# Patient Record
Sex: MALE | Race: WHITE
[De-identification: names, ages, dates, MRNs, and addresses within clinical notes are randomized per-mention and may not be internally consistent; named-entity substitution may affect disease eponyms.]

---

## 2021-11-23 ENCOUNTER — HOSPITAL ENCOUNTER (EMERGENCY)
Dept: HOSPITAL 76 - ED | Age: 51
Discharge: HOME | End: 2021-11-23
Payer: COMMERCIAL

## 2021-11-23 VITALS — DIASTOLIC BLOOD PRESSURE: 89 MMHG | SYSTOLIC BLOOD PRESSURE: 164 MMHG

## 2021-11-23 DIAGNOSIS — S50.01XA: Primary | ICD-10-CM
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
## 2021-11-23 PROCEDURE — 73080 X-RAY EXAM OF ELBOW: CPT

## 2021-11-23 PROCEDURE — 99283 EMERGENCY DEPT VISIT LOW MDM: CPT

## 2021-11-23 RX ADMIN — MELOXICAM STA MG: 7.5 TABLET ORAL at 11:31

## 2021-11-23 NOTE — ED PHYSICIAN DOCUMENTATION
PD HPI UPPER EXT INJURY





- Stated complaint


Stated Complaint: R ELBOW PX





- Chief complaint


Chief Complaint: Ext Problem





- History obtained from


History obtained from: Patient





- History of Present Illness


Location: Right, Elbow


Type of injury: Blunt / blow


Where injury occurred: Home


Timing - onset: Last night, How many days ago (1)


Timing - duration: Days (1)


Timing - details: Abrupt onset


Pain level max: 7


Pain level now: 5


Worsened by: Moving, Palpating


Associated symptoms: Swelling


Contributing factors: No: Anticoagulated, Prior ortho surgery, Prosthetic joint,

Work related


Similar symptoms before: Has not had sx before





- Additonal information


Additional information: 





51-year-old male presents with right elbow pain.  He states he hit it on a door 

last night and is swollen and painful today.  Worse with movement, better with 

rest.  No numbness or tingling.  Patient is not on any medications at home.





Review of Systems


Constitutional: denies: Fever, Chills


Respiratory: denies: Cough


GI: denies: Nausea, Vomiting, Diarrhea


Skin: denies: Rash


Musculoskeletal: denies: Neck pain, Back pain


Neurologic: denies: Headache





PD PAST MEDICAL HISTORY





- Past Medical History


Past Medical History: No





- Past Surgical History


Past Surgical History: No





- Present Medications


Home Medications: 


                                Ambulatory Orders











 Medication  Instructions  Recorded  Confirmed


 


HYDROcod/ACETAM 5/325 [Norco 5/325] 1 - 2 ea PO Q6H PRN #14 tablet 11/23/21 


 


Meloxicam [Mobic] 15 mg PO DAILY PRN #20 tablet 11/23/21 














- Allergies


Allergies/Adverse Reactions: 


                                    Allergies











Allergy/AdvReac Type Severity Reaction Status Date / Time


 


No Known Drug Allergies Allergy   Verified 11/23/21 11:10














- Living Situation


Living Situation: reports: With family


Living Arrangement: reports: At home





- Social History


Does the pt have substance abuse?: No





PD ED PE NORMAL





- Vitals


Vital signs reviewed: Yes





- General


General: Alert and oriented X 3, No acute distress





- HEENT


HEENT: Moist mucous membranes





- Derm


Derm: Warm and dry





- Extremities


Extremities: Other (R elbow - There is swelling over the distal aspect of the 

ulna.  Full range of motion.  Supination and pronation intact.  Neurovascular 

intact.  Otherwise normal examination of the right upper extremity)





- Neuro


Neuro: Alert and oriented X 3





Results





- Vitals


Vitals: 


                               Vital Signs - 24 hr











  11/23/21





  11:05


 


Temperature 36.9 C


 


Heart Rate 93


 


Respiratory 17





Rate 


 


Blood Pressure 164/89 H


 


O2 Saturation 97








                                     Oxygen











O2 Source                      Room air

















- Rads (name of study)


  ** Right elbow x-ray


Radiology: Final report received, EMP read contemporaneously, See rad report (No

 acute osseous abnormality)





PD MEDICAL DECISION MAKING





- ED course


Complexity details: reviewed results, considered differential, d/w patient


ED course: 





51-year-old male with right elbow pain.  No acute findings on x-ray.  We will 

place on pain medication for home.  He has ice at home as well as heating pads. 

 Using the arm well here.  Patient counseled regarding signs and symptoms for 

which I believe and urgent re-evaluation would be necessary. Patient with good 

understanding of and agreement to plan and is comfortable going home at this 

time





This document was made in part using voice recognition software. While efforts 

are made to proofread this document, sound alike and grammatical errors may 

occur.





I am prescribing a short course of short-acting opioid pain medication for this 

patient. I have reviewed the patients  and no concerning findings were 

noted. I have discussed that the opioids are for short term therapy only, and 

will not be refilled from the ED. 





Departure





- Departure


Disposition: 01 Home, Self Care


Clinical Impression: 


 Hematoma





Condition: Good


Instructions:  ED Hematoma


Follow-Up: 


your,doctor in 1 week if not better [Other]


Prescriptions: 


Meloxicam [Mobic] 15 mg PO DAILY PRN #20 tablet


 PRN Reason: pain


HYDROcod/ACETAM 5/325 [Norco 5/325] 1 - 2 ea PO Q6H PRN #14 tablet


 PRN Reason: Pain


Comments: 


Your prescriptions were sent to the [facility] pharmacy.  Please 

follow-up with your doctor for further care.  Ice and compression will likely 

help the hematoma.  Your x-ray does not show any fractures today.








I am prescribing a short course of narcotic pain medication for you. These are 

potentially dangerous and addictive medications that should be used carefully. 


These medications may constipate you. Take an over-the-counter stool softener 

(docusate) twice daily with plenty of water while taking these medications. If 

you go 24 hours without a bowel movement, take over-the-counter miralax, per 

package instructions.


Do not drink or drive while taking these medications. 


If you received narcotic or sedating medications while in the emergency 

department, do not drive for 24 hours.


Store this medication in a safe, secure place and out of reach of children. 


It is a violation of federal law to give or sell this medication to another 

person or to use in a manner other than prescribed.


The ED will not refill narcotic prescriptions, including prescriptions lost or 

stolen.


To dispose of unwanted medications:


1. [facility] at [address] in 

[place] has a medication drop box. They accept prescription medications (in 

pill form) Monday through Friday 9:00 a.m. to 5:00 p.m. 


2. The [facility] [employer] accepts prescription medications (in

pill form only) for disposal year round. Call [phone] for more 

information. 


3. Contact the [facility] for the next [facility] sponsored prescription 

drug collection event. [phone], [phone], or [phone] 

[phone];


Discharge Date/Time: 11/23/21 12:02